# Patient Record
Sex: FEMALE | Race: WHITE | NOT HISPANIC OR LATINO | Employment: OTHER | URBAN - METROPOLITAN AREA
[De-identification: names, ages, dates, MRNs, and addresses within clinical notes are randomized per-mention and may not be internally consistent; named-entity substitution may affect disease eponyms.]

---

## 2017-02-25 LAB
ALBUMIN SERPL-MCNC: 4.6 G/DL (ref 3.6–4.8)
ALBUMIN/GLOB SERPL: 2.1 {RATIO} (ref 1.1–2.5)
ALP SERPL-CCNC: 84 IU/L (ref 39–117)
ALT SERPL-CCNC: 26 IU/L (ref 0–32)
AST SERPL-CCNC: 26 IU/L (ref 0–40)
BILIRUB SERPL-MCNC: 1.2 MG/DL (ref 0–1.2)
BUN SERPL-MCNC: 11 MG/DL (ref 8–27)
BUN/CREAT SERPL: 14 (ref 11–26)
CALCIUM SERPL-MCNC: 10.7 MG/DL (ref 8.7–10.3)
CHLORIDE SERPL-SCNC: 106 MMOL/L (ref 96–106)
CHOLEST SERPL-MCNC: 179 MG/DL (ref 100–199)
CO2 SERPL-SCNC: 22 MMOL/L (ref 18–29)
COMMENT 011824: ABNORMAL
CREAT SERPL-MCNC: 0.8 MG/DL (ref 0.57–1)
GLOBULIN SER CALC-MCNC: 2.2 G/DL (ref 1.5–4.5)
GLUCOSE SERPL-MCNC: 107 MG/DL (ref 65–99)
HDLC SERPL-MCNC: 62 MG/DL
LDLC SERPL CALC-MCNC: 83 MG/DL (ref 0–99)
POTASSIUM SERPL-SCNC: 4.4 MMOL/L (ref 3.5–5.2)
PROT SERPL-MCNC: 6.8 G/DL (ref 6–8.5)
SODIUM SERPL-SCNC: 144 MMOL/L (ref 134–144)
TRIGL SERPL-MCNC: 169 MG/DL (ref 0–149)
VLDLC SERPL CALC-MCNC: 34 MG/DL (ref 5–40)

## 2017-03-01 ENCOUNTER — OFFICE VISIT (OUTPATIENT)
Dept: INTERNAL MEDICINE | Facility: MEDICAL CENTER | Age: 67
End: 2017-03-01
Payer: COMMERCIAL

## 2017-03-01 VITALS
BODY MASS INDEX: 41.12 KG/M2 | SYSTOLIC BLOOD PRESSURE: 122 MMHG | RESPIRATION RATE: 20 BRPM | OXYGEN SATURATION: 96 % | HEART RATE: 82 BPM | WEIGHT: 217.8 LBS | HEIGHT: 61 IN | DIASTOLIC BLOOD PRESSURE: 60 MMHG | TEMPERATURE: 97.6 F

## 2017-03-01 DIAGNOSIS — E66.01 MORBID OBESITY DUE TO EXCESS CALORIES (HCC): ICD-10-CM

## 2017-03-01 DIAGNOSIS — I10 ESSENTIAL HYPERTENSION: ICD-10-CM

## 2017-03-01 DIAGNOSIS — E78.5 DYSLIPIDEMIA: ICD-10-CM

## 2017-03-01 DIAGNOSIS — G89.29 CHRONIC RIGHT SHOULDER PAIN: ICD-10-CM

## 2017-03-01 DIAGNOSIS — E83.52 HYPERCALCEMIA: ICD-10-CM

## 2017-03-01 DIAGNOSIS — R73.02 GLUCOSE INTOLERANCE (IMPAIRED GLUCOSE TOLERANCE): ICD-10-CM

## 2017-03-01 DIAGNOSIS — Z23 NEED FOR TD VACCINE: ICD-10-CM

## 2017-03-01 DIAGNOSIS — M25.511 CHRONIC RIGHT SHOULDER PAIN: ICD-10-CM

## 2017-03-01 PROCEDURE — 90714 TD VACC NO PRESV 7 YRS+ IM: CPT | Performed by: INTERNAL MEDICINE

## 2017-03-01 PROCEDURE — 90471 IMMUNIZATION ADMIN: CPT | Performed by: INTERNAL MEDICINE

## 2017-03-01 PROCEDURE — 99214 OFFICE O/P EST MOD 30 MIN: CPT | Mod: 25 | Performed by: INTERNAL MEDICINE

## 2017-03-01 RX ORDER — ATORVASTATIN CALCIUM 10 MG/1
TABLET, FILM COATED ORAL
Qty: 90 TAB | Refills: 2 | Status: SHIPPED | OUTPATIENT
Start: 2017-03-01 | End: 2017-03-08 | Stop reason: SDUPTHER

## 2017-03-01 RX ORDER — LISINOPRIL 20 MG/1
TABLET ORAL
Qty: 90 TAB | Refills: 2 | Status: SHIPPED | OUTPATIENT
Start: 2017-03-01 | End: 2017-03-08 | Stop reason: SDUPTHER

## 2017-03-01 ASSESSMENT — PAIN SCALES - GENERAL: PAINLEVEL: 3=SLIGHT PAIN

## 2017-03-01 NOTE — MR AVS SNAPSHOT
"        Suzanne Henderson   3/1/2017 10:40 AM   Office Visit   MRN: [id number]    Department:  [facility] Med - Internal Med   Dept Phone:  [phone]    Description:  Female : 1950   Provider:  Nila Wilde M.D.           Reason for Visit     Results labs    Medication Refill tramadol      Allergies as of 3/1/2017     No Known Allergies      You were diagnosed with     Chronic right shoulder pain   [914082]       Essential hypertension   [7698693]       Morbid obesity due to excess calories (CMS-[facility])   [3584454]       Dyslipidemia   [886714]       Hypercalcemia   [275.42.ICD-9-CM]       Glucose intolerance (impaired glucose tolerance)   [610260]       Need for TD vaccine   [221519]         Vital Signs     Blood Pressure Pulse Temperature Respirations Height Weight    122/60 mmHg 82 36.4 °C (97.6 °F) 20 1.549 m (5' 0.98\") 98.793 kg (217 lb 12.8 oz)    Body Mass Index Oxygen Saturation Breastfeeding? Smoking Status          41.17 kg/m2 96% No Former Smoker        Basic Information     Date Of Birth Sex Race Ethnicity Preferred Language    1950 Female White Non- English      Your appointments     Mar 14, 2017  7:30 AM   MR EXTREMITY WITHOUT (30) with [address] 1   [facility] IMAGING - MRI - [address]    [address]  [place] [address] [phone]            Sep 06, 2017 11:00 AM   Established Patient with Nila Wilde M.D.   [facility] Med - Internal Medicine (--)    [address] [place] [address]  [address]  [facility] [id number] [phone]           You will be receiving a confirmation call a few days before your appointment from our automated call confirmation system.              Problem List              ICD-10-CM Priority Class Noted - Resolved    Other specified disorder of gallbladder K82.8   2/10/2014 - Present    FH: ovarian cancer Z80.41   2016 - Present    Osteoarthritis of finger M19.049   2016 - Present    Morbid obesity (CMS-[facility]) E66.01   " 6/17/2016 - Present    Herpes zoster B02.9   6/17/2016 - Present    RA (rheumatoid arthritis) (CMS-[facility]) M06.9   6/17/2016 - Present    Back pain M54.9   6/17/2016 - Present    Hypercalcemia E83.52   6/17/2016 - Present    Childhood asthma J45.909   6/17/2016 - Present    Osteopenia M85.80   6/17/2016 - Present    Essential hypertension I10   6/17/2016 - Present    Dyslipidemia E78.5   6/17/2016 - Present    Chronic right shoulder pain M25.511, G89.29   3/1/2017 - Present      Health Maintenance        Date Due Completion Dates    IMM PNEUMOCOCCAL 19-64 (ADULT) MEDIUM RISK SERIES (1 of 1 - PPSV23) 3/25/1971 ---    PAP SMEAR 3/25/1973 ---    IMM DTaP/Tdap/Td Vaccine (1 - Tdap) 11/28/1993 11/27/1993    IMM ZOSTER VACCINE 3/25/2012 ---    IMM INFLUENZA (1) 9/1/2016 ---    COLONOSCOPY 7/11/2017 7/11/2007    MAMMOGRAM 11/2/2017 11/2/2016, 10/2/2015, 9/23/2014, 9/19/2013, 12/30/2011, 11/16/2010, 10/28/2009, 10/28/2009, 6/6/2008, 6/6/2008, 5/25/2007, 5/25/2007, 10/11/2005            Current Immunizations     TD Vaccine  Incomplete, 11/27/1993      Below and/or attached are the medications your provider expects you to take. Review all of your home medications and newly ordered medications with your provider and/or pharmacist. Follow medication instructions as directed by your provider and/or pharmacist. Please keep your medication list with you and share with your provider. Update the information when medications are discontinued, doses are changed, or new medications (including over-the-counter products) are added; and carry medication information at all times in the event of emergency situations     Allergies:  No Known Allergies          Medications  Valid as of: March 01, 2017 - 11:44 AM    Generic Name Brand Name Tablet Size Instructions for use    Aspirin (Chew Tab) ASA 81 MG Take 81 mg by mouth every 48 hours. Indications: taking mon and thurs        Atorvastatin Calcium (Tab) LIPITOR 10 MG TAKE 1 TABLET AT BEDTIME         Cholecalciferol (Tab) cholecalciferol 1000 UNIT Take 5,000 Units by mouth every evening. 3 times a week        Cyclobenzaprine HCl (Tab) FLEXERIL 10 MG Take 10 mg by mouth 3 times a day as needed.        Lisinopril (Tab) PRINIVIL 20 MG TAKE 1 TABLET DAILY        TraMADol HCl (Tab) ULTRAM 50 MG Take  mg by mouth every four hours as needed.        .                 Medicines prescribed today were sent to:     [facility] #[id number] - [place] - [address] [place] [address]    Phone: [phone] Fax: [phone]    Open 24 Hours?: No    [facility] - [address] [address]    Phone: [phone] Fax: [phone]    Open 24 Hours?: No      Medication refill instructions:       If your prescription bottle indicates you have medication refills left, it is not necessary to call your provider’s office. Please contact your pharmacy and they will refill your medication.    If your prescription bottle indicates you do not have any refills left, you may request refills at any time through one of the following ways: The online [employer] system (except Urgent Care), by calling your provider’s office, or by asking your pharmacy to contact your provider’s office with a refill request. Medication refills are processed only during regular business hours and may not be available until the next business day. Your provider may request additional information or to have a follow-up visit with you prior to refilling your medication.   *Please Note: Medication refills are assigned a new Rx number when refilled electronically. Your pharmacy may indicate that no refills were authorized even though a new prescription for the same medication is available at the pharmacy. Please request the medicine by name with the pharmacy before contacting your provider for a refill.        Your To Do List     Future Labs/Procedures Complete By Expires    CBC  WITH DIFFERENTIAL  As directed 3/1/2018    COMP METABOLIC PANEL  As directed 3/1/2018    HEMOGLOBIN A1C  As directed 3/1/2018    PTH INTACT  As directed 3/2/2018      Instructions    Shoulder Pain  The shoulder is the joint that connects your arms to your body. The bones that form the shoulder joint include the upper arm bone (humerus), the shoulder blade (scapula), and the collarbone (clavicle). The top of the humerus is shaped like a ball and fits into a rather flat socket on the scapula (glenoid cavity). A combination of muscles and strong, fibrous tissues that connect muscles to bones (tendons) support your shoulder joint and hold the ball in the socket. Small, fluid-filled sacs (bursae) are located in different areas of the joint. They act as cushions between the bones and the overlying soft tissues and help reduce friction between the gliding tendons and the bone as you move your arm. Your shoulder joint allows a wide range of motion in your arm. This range of motion allows you to do things like scratch your back or throw a ball. However, this range of motion also makes your shoulder more prone to pain from overuse and injury.  Causes of shoulder pain can originate from both injury and overuse and usually can be grouped in the following four categories:  · Redness, swelling, and pain (inflammation) of the tendon (tendinitis) or the bursae (bursitis).  · Instability, such as a dislocation of the joint.  · Inflammation of the joint (arthritis).  · Broken bone (fracture).  HOME CARE INSTRUCTIONS   · Apply ice to the sore area.  ¨ Put ice in a plastic bag.  ¨ Place a towel between your skin and the bag.  ¨ Leave the ice on for 15-20 minutes, 3-4 times per day for the first 2 days, or as directed by your health care provider.  · Stop using cold packs if they do not help with the pain.  · If you have a shoulder sling or immobilizer, wear it as long as your caregiver instructs. Only remove it to shower or bathe. Move  your arm as little as possible, but keep your hand moving to prevent swelling.  · Squeeze a soft ball or foam pad as much as possible to help prevent swelling.  · Only take over-the-counter or prescription medicines for pain, discomfort, or fever as directed by your caregiver.  SEEK MEDICAL CARE IF:   · Your shoulder pain increases, or new pain develops in your arm, hand, or fingers.  · Your hand or fingers become cold and numb.  · Your pain is not relieved with medicines.  SEEK IMMEDIATE MEDICAL CARE IF:   · Your arm, hand, or fingers are numb or tingling.  · Your arm, hand, or fingers are significantly swollen or turn white or blue.  MAKE SURE YOU:   · Understand these instructions.  · Will watch your condition.  · Will get help right away if you are not doing well or get worse.     This information is not intended to replace advice given to you by your health care provider. Make sure you discuss any questions you have with your health care provider.     Document Released: 09/27/2006 Document Revised: 01/08/2016 Document Reviewed: 04/11/2016  [employer] Interactive Patient Education ©2016 Elsevier Inc.            [employer] Access Code: Activation code not generated  Current [employer] Status: Active

## 2017-03-01 NOTE — PATIENT INSTRUCTIONS
Shoulder Pain  The shoulder is the joint that connects your arms to your body. The bones that form the shoulder joint include the upper arm bone (humerus), the shoulder blade (scapula), and the collarbone (clavicle). The top of the humerus is shaped like a ball and fits into a rather flat socket on the scapula (glenoid cavity). A combination of muscles and strong, fibrous tissues that connect muscles to bones (tendons) support your shoulder joint and hold the ball in the socket. Small, fluid-filled sacs (bursae) are located in different areas of the joint. They act as cushions between the bones and the overlying soft tissues and help reduce friction between the gliding tendons and the bone as you move your arm. Your shoulder joint allows a wide range of motion in your arm. This range of motion allows you to do things like scratch your back or throw a ball. However, this range of motion also makes your shoulder more prone to pain from overuse and injury.  Causes of shoulder pain can originate from both injury and overuse and usually can be grouped in the following four categories:  · Redness, swelling, and pain (inflammation) of the tendon (tendinitis) or the bursae (bursitis).  · Instability, such as a dislocation of the joint.  · Inflammation of the joint (arthritis).  · Broken bone (fracture).  HOME CARE INSTRUCTIONS   · Apply ice to the sore area.  ¨ Put ice in a plastic bag.  ¨ Place a towel between your skin and the bag.  ¨ Leave the ice on for 15-20 minutes, 3-4 times per day for the first 2 days, or as directed by your health care provider.  · Stop using cold packs if they do not help with the pain.  · If you have a shoulder sling or immobilizer, wear it as long as your caregiver instructs. Only remove it to shower or bathe. Move your arm as little as possible, but keep your hand moving to prevent swelling.  · Squeeze a soft ball or foam pad as much as possible to help prevent swelling.  · Only take  over-the-counter or prescription medicines for pain, discomfort, or fever as directed by your caregiver.  SEEK MEDICAL CARE IF:   · Your shoulder pain increases, or new pain develops in your arm, hand, or fingers.  · Your hand or fingers become cold and numb.  · Your pain is not relieved with medicines.  SEEK IMMEDIATE MEDICAL CARE IF:   · Your arm, hand, or fingers are numb or tingling.  · Your arm, hand, or fingers are significantly swollen or turn white or blue.  MAKE SURE YOU:   · Understand these instructions.  · Will watch your condition.  · Will get help right away if you are not doing well or get worse.     This information is not intended to replace advice given to you by your health care provider. Make sure you discuss any questions you have with your health care provider.     Document Released: 09/27/2006 Document Revised: 01/08/2016 Document Reviewed: 04/11/2016  [email or web address] Interactive Patient Education ©2016 Elsevier Inc.

## 2017-03-02 NOTE — PROGRESS NOTES
"Chief Complaint   Patient presents with   • Results     labs   • Medication Refill     tramadol       HISTORY OF PRESENT ILLNESS: Patient is a 66 y.o. female established patient who presents today for the following.      1. Chronic right shoulder pain  Complaint of right shoulder issues for more than a year. She remembers doing something in the garden with pulling and pushing and noticed some pain in her shoulder long time ago. Tried physical therapy but did not help. Seen orthopedic doctor in the past and an MRI scheduled after recent rheumatology evaluation. Currently on tramadol for pain    2. Essential hypertension  Taking her blood pressure pills on a regular basis and denies having any dizziness, chest pains, shortness of breath, blurry vision or headaches    3. Morbid obesity due to excess calories (CMS-HCC)  Gained some weight since last visit due to stressors and breakup with her partner after 15 years. Also planning to move to [place] eventually as there is a threat to her job with some contract issues.    4. Dyslipidemia  Taking a statin on a regular basis and no side effects of myalgias    5. Hypercalcemia  Denies any abdominal pain or pain with urination or blood in urine.    6. Glucose intolerance (impaired glucose tolerance)  Had done labs recently and here for follow-up has been eating a lot of sweets due to her stressors    7. Need for TD vaccine  Does not her mother last tetanus vaccine      Past Medical History   Diagnosis Date   • Hypertension    • Anesthesia      Severe PONV   • Arthritis      generalized   • Other specified disorder of intestines      constipation/diarrhea   • Dental disorder      one tooth with a \"pocket\"; harbors bacteria; packed with \"medicine\"   • Pain 02/07/14     RUQ 2/10   • Allergy, unspecified not elsewhere classified    • FH: ovarian cancer 6/17/2016   • Osteoarthritis of finger 6/17/2016   • Hand numbness 6/17/2016   • Morbid obesity (CMS-HCC) 6/17/2016   • Herpes " zoster 6/17/2016   • RA (rheumatoid arthritis) (CMS-HCC) 6/17/2016   • Back pain 6/17/2016   • Hypercalcemia 6/17/2016   • Childhood asthma 6/17/2016   • Osteopenia 6/17/2016   • HTN (hypertension) 6/17/2016   • Dyslipidemia 6/17/2016       Patient Active Problem List    Diagnosis Date Noted   • Chronic right shoulder pain 03/01/2017   • FH: ovarian cancer 06/17/2016   • Osteoarthritis of finger 06/17/2016   • Morbid obesity (CMS-HCC) 06/17/2016   • Herpes zoster 06/17/2016   • RA (rheumatoid arthritis) (CMS-HCC) 06/17/2016   • Back pain 06/17/2016   • Hypercalcemia 06/17/2016   • Childhood asthma 06/17/2016   • Osteopenia 06/17/2016   • Essential hypertension 06/17/2016   • Dyslipidemia 06/17/2016   • Other specified disorder of gallbladder 02/10/2014       Allergies:Review of patient's allergies indicates no known allergies.    Current Outpatient Prescriptions   Medication Sig Dispense Refill   • atorvastatin (LIPITOR) 10 MG Tab TAKE 1 TABLET AT BEDTIME 90 Tab 2   • lisinopril (PRINIVIL) 20 MG Tab TAKE 1 TABLET DAILY 90 Tab 2   • cyclobenzaprine (FLEXERIL) 10 MG Tab Take 10 mg by mouth 3 times a day as needed.     • tramadol (ULTRAM) 50 MG Tab Take  mg by mouth every four hours as needed.     • aspirin (ASA) 81 MG CHEW Take 81 mg by mouth every 48 hours. Indications: taking mon and thurs     • vitamin D (CHOLECALCIFEROL) 1000 UNIT TABS Take 5,000 Units by mouth every evening. 3 times a week       No current facility-administered medications for this visit.       Social History   Substance Use Topics   • Smoking status: Former Smoker -- 1.00 packs/day for 20 years     Types: Cigarettes     Quit date: 01/01/1985   • Smokeless tobacco: Never Used   • Alcohol Use: 1.2 oz/week     2 Shots of liquor, 0 Standard drinks or equivalent per week      Comment: soc        Family History   Problem Relation Age of Onset   • Cancer Mother      OVARIAN         Review of Systems    Patient denies  "Fevers/chills/nausea/vomiting/chest pain/sob/blood in stools/black stools/blood in urine. See HPI    Exam:  Blood pressure 122/60, pulse 82, temperature 36.4 °C (97.6 °F), resp. rate 20, height 1.549 m (5' 0.98\"), weight 98.793 kg (217 lb 12.8 oz), SpO2 96 %, not currently breastfeeding. Body mass index is 41.17 kg/(m^2).  Constitutional:  NAD, well appearing.  HEENT:   NC/AT  Cardiovascular: RRR.   No m/r/g. No carotid bruits.       Lungs:   CTAB, no w/r/r, no respiratory distress.  Abdomen: Soft, NT/ND + BS, no masses, no suprapubic tenderness, no hepatomegaly.  Extremities:  2+ DP and radial pulses bilaterally.  No c/c/e. Range of motion limited due to pain but she is able to rotate and abduct and adduct with some assistance.  Skin:  Warm and dry.    Neurologic: Alert & oriented x 3, CN II-XII grossly intact, strength and sensation grossly intact.  No focal deficits noted.  Psychiatric:  Affect normal, mood normal, judgment normal.    Assessment/Plan:     1. Chronic right shoulder pain  Continue follow-up with rheumatologist and MRI as scheduled discussed about taking tramadol with Tylenol if the pain is intense.  - CBC WITH DIFFERENTIAL; Future    2. Essential hypertension  Low salt diet and exercise to be continued along with the lisinopril.  - lisinopril (PRINIVIL) 20 MG Tab; TAKE 1 TABLET DAILY  Dispense: 90 Tab; Refill: 2  - COMP METABOLIC PANEL; Future    3. Morbid obesity due to excess calories (CMS-HCC)  Discussed about diet and exercise and need to cut down processed food as well as other sugar especially given her new diagnosis of glucose intolerance. Patient understands the necessity to lose weight    4. Dyslipidemia  Diet and exercise to be maintained and continue atorvastatin as prescribed  - atorvastatin (LIPITOR) 10 MG Tab; TAKE 1 TABLET AT BEDTIME  Dispense: 90 Tab; Refill: 2    5. Hypercalcemia  Chronic elevation but Calcium levels much better now than the past and continue drinking plenty of " fluids and stay well from calcium supplements. Follow-up PTH level  - CBC WITH DIFFERENTIAL; Future  - COMP METABOLIC PANEL; Future  - PTH INTACT; Future    6. Glucose intolerance (impaired glucose tolerance)  New onset of glucose intolerance and discussed about its relation with her weight gain. Patient understands the necessity to lose weight check A1c next visit  - HEMOGLOBIN A1C; Future    7. Need for TD vaccine  TD vaccine given today in the office  - TD =>6yo IM      Followup: Return in about 6 months (around 9/1/2017).    Please note that this dictation was created using voice recognition software. I have made every reasonable attempt to correct obvious errors, but I expect that there are errors of grammar and possibly content that I did not discover before finalizing the note.

## 2017-03-08 DIAGNOSIS — I10 ESSENTIAL HYPERTENSION: ICD-10-CM

## 2017-03-08 DIAGNOSIS — E78.5 DYSLIPIDEMIA: ICD-10-CM

## 2017-03-08 RX ORDER — ATORVASTATIN CALCIUM 10 MG/1
TABLET, FILM COATED ORAL
Qty: 90 TAB | Refills: 3 | Status: SHIPPED | OUTPATIENT
Start: 2017-03-08 | End: 2017-09-08 | Stop reason: SDUPTHER

## 2017-03-08 RX ORDER — LISINOPRIL 20 MG/1
TABLET ORAL
Qty: 90 TAB | Refills: 2 | Status: SHIPPED | OUTPATIENT
Start: 2017-03-08 | End: 2017-09-08 | Stop reason: SDUPTHER

## 2017-03-08 NOTE — TELEPHONE ENCOUNTER
Pt would like to know if she should be on the atorvastatin due to labs readings showing she is always high on calcium

## 2017-03-08 NOTE — TELEPHONE ENCOUNTER
Last seen: 03/03/17 by Dr. Wilde  Next appt: 09/06/17 with Dr. Wilde    Was the patient seen in the last year in this department? Yes   Does patient have an active prescription for medications requested? No   Received Request Via: Pharmacy Mail svcs      Pt also would like to know on the atorvastatin it states it has calcium when she gets lab work done it shows calcium is high should she continue taking atorvastatin

## 2017-03-14 ENCOUNTER — APPOINTMENT (OUTPATIENT)
Dept: RADIOLOGY | Facility: MEDICAL CENTER | Age: 67
End: 2017-03-14
Attending: SPECIALIST
Payer: COMMERCIAL

## 2017-04-17 RX ORDER — CYCLOBENZAPRINE HCL 10 MG
10 TABLET ORAL 3 TIMES DAILY PRN
Qty: 30 TAB | Refills: 0 | Status: SHIPPED | OUTPATIENT
Start: 2017-04-17 | End: 2017-04-28

## 2017-04-17 NOTE — TELEPHONE ENCOUNTER
Last seen: 03/01/17 by Dr. Wilde  Next appt: 09/06/17 with Dr. Wilde    Was the patient seen in the last year in this department? Yes   Does patient have an active prescription for medications requested? No   Received Request Via: Pharmacy

## 2017-07-20 ENCOUNTER — TELEPHONE (OUTPATIENT)
Dept: INTERNAL MEDICINE | Facility: MEDICAL CENTER | Age: 67
End: 2017-07-20

## 2017-07-20 NOTE — TELEPHONE ENCOUNTER
? Is this stinger out? If not the recommendation is to scrape it sideways with the edge of a credit card to pull out the stinger. Not to grab it with tweezers. And generally just applying ice and maybe a topical steroid cream such as over-the-counter Cortaid is all that is needed. If there is significant swelling pain or redness she should be seen in the office. If there is any problems breathing she needs to call 911.

## 2017-07-20 NOTE — TELEPHONE ENCOUNTER
----- Message from Chung Rogers sent at 7/20/2017 10:15 AM PDT -----  Regarding: QUESTIONS ABOUT WASP STING[facility]  Contact: [phone]  Pt states she was stung by a wasp and has a question about what to do.

## 2017-09-07 LAB
ALBUMIN SERPL-MCNC: 4.5 G/DL (ref 3.6–4.8)
ALBUMIN/GLOB SERPL: 1.8 {RATIO} (ref 1.2–2.2)
ALP SERPL-CCNC: 93 IU/L (ref 39–117)
ALT SERPL-CCNC: 29 IU/L (ref 0–32)
AST SERPL-CCNC: 29 IU/L (ref 0–40)
BASOPHILS # BLD AUTO: 0 X10E3/UL (ref 0–0.2)
BASOPHILS NFR BLD AUTO: 0 %
BILIRUB SERPL-MCNC: 0.7 MG/DL (ref 0–1.2)
BUN SERPL-MCNC: 11 MG/DL (ref 8–27)
BUN/CREAT SERPL: 15 (ref 12–28)
CALCIUM SERPL-MCNC: 10.5 MG/DL (ref 8.7–10.3)
CHLORIDE SERPL-SCNC: 107 MMOL/L (ref 96–106)
CO2 SERPL-SCNC: 18 MMOL/L (ref 18–29)
CREAT SERPL-MCNC: 0.72 MG/DL (ref 0.57–1)
EOSINOPHIL # BLD AUTO: 0.1 X10E3/UL (ref 0–0.4)
EOSINOPHIL NFR BLD AUTO: 2 %
ERYTHROCYTE [DISTWIDTH] IN BLOOD BY AUTOMATED COUNT: 13.6 % (ref 12.3–15.4)
GLOBULIN SER CALC-MCNC: 2.5 G/DL (ref 1.5–4.5)
GLUCOSE SERPL-MCNC: 112 MG/DL (ref 65–99)
HBA1C MFR BLD: 5.5 % (ref 4.8–5.6)
HCT VFR BLD AUTO: 41.5 % (ref 34–46.6)
HGB BLD-MCNC: 14.1 G/DL (ref 11.1–15.9)
IMM GRANULOCYTES # BLD: 0 X10E3/UL (ref 0–0.1)
IMM GRANULOCYTES NFR BLD: 0 %
IMMATURE CELLS  115398: NORMAL
LYMPHOCYTES # BLD AUTO: 2.3 X10E3/UL (ref 0.7–3.1)
LYMPHOCYTES NFR BLD AUTO: 38 %
MCH RBC QN AUTO: 31.6 PG (ref 26.6–33)
MCHC RBC AUTO-ENTMCNC: 34 G/DL (ref 31.5–35.7)
MCV RBC AUTO: 93 FL (ref 79–97)
MONOCYTES # BLD AUTO: 0.8 X10E3/UL (ref 0.1–0.9)
MONOCYTES NFR BLD AUTO: 13 %
MORPHOLOGY BLD-IMP: NORMAL
NEUTROPHILS # BLD AUTO: 2.9 X10E3/UL (ref 1.4–7)
NEUTROPHILS NFR BLD AUTO: 47 %
NRBC BLD AUTO-RTO: NORMAL %
PLATELET # BLD AUTO: 211 X10E3/UL (ref 150–379)
POTASSIUM SERPL-SCNC: 4.1 MMOL/L (ref 3.5–5.2)
PROT SERPL-MCNC: 7 G/DL (ref 6–8.5)
PTH-INTACT SERPL-MCNC: 68 PG/ML (ref 15–65)
RBC # BLD AUTO: 4.46 X10E6/UL (ref 3.77–5.28)
SODIUM SERPL-SCNC: 141 MMOL/L (ref 134–144)
WBC # BLD AUTO: 6.1 X10E3/UL (ref 3.4–10.8)

## 2017-09-08 ENCOUNTER — OFFICE VISIT (OUTPATIENT)
Dept: INTERNAL MEDICINE | Facility: MEDICAL CENTER | Age: 67
End: 2017-09-08
Payer: MEDICARE

## 2017-09-08 VITALS
WEIGHT: 218 LBS | OXYGEN SATURATION: 96 % | HEART RATE: 60 BPM | DIASTOLIC BLOOD PRESSURE: 78 MMHG | TEMPERATURE: 98.4 F | SYSTOLIC BLOOD PRESSURE: 130 MMHG | HEIGHT: 61 IN | BODY MASS INDEX: 41.16 KG/M2

## 2017-09-08 DIAGNOSIS — M25.50 POLYARTHRALGIA: ICD-10-CM

## 2017-09-08 DIAGNOSIS — E66.01 MORBID OBESITY DUE TO EXCESS CALORIES (HCC): ICD-10-CM

## 2017-09-08 DIAGNOSIS — G89.29 CHRONIC UPPER BACK PAIN: ICD-10-CM

## 2017-09-08 DIAGNOSIS — M81.0 POST-MENOPAUSAL OSTEOPOROSIS: ICD-10-CM

## 2017-09-08 DIAGNOSIS — I10 ESSENTIAL HYPERTENSION: ICD-10-CM

## 2017-09-08 DIAGNOSIS — E83.52 HYPERCALCEMIA: ICD-10-CM

## 2017-09-08 DIAGNOSIS — M54.9 CHRONIC UPPER BACK PAIN: ICD-10-CM

## 2017-09-08 DIAGNOSIS — E78.5 DYSLIPIDEMIA: ICD-10-CM

## 2017-09-08 PROCEDURE — 99214 OFFICE O/P EST MOD 30 MIN: CPT | Performed by: INTERNAL MEDICINE

## 2017-09-08 RX ORDER — LISINOPRIL 20 MG/1
TABLET ORAL
Qty: 90 TAB | Refills: 3 | Status: SHIPPED
Start: 2017-09-08 | End: 2017-09-25 | Stop reason: SDUPTHER

## 2017-09-08 RX ORDER — WITCH HAZEL 50 %
PADS, MEDICATED (EA) TOPICAL
COMMUNITY
End: 2019-04-10

## 2017-09-08 RX ORDER — TRAMADOL HYDROCHLORIDE 50 MG/1
50-100 TABLET ORAL EVERY 8 HOURS PRN
Qty: 60 TAB | Refills: 2 | Status: SHIPPED
Start: 2017-09-08

## 2017-09-08 RX ORDER — TIZANIDINE 4 MG/1
4 TABLET ORAL 3 TIMES DAILY
Qty: 30 TAB | Refills: 0 | Status: SHIPPED
Start: 2017-09-08

## 2017-09-08 RX ORDER — ATORVASTATIN CALCIUM 10 MG/1
TABLET, FILM COATED ORAL
Qty: 90 TAB | Refills: 3 | Status: SHIPPED
Start: 2017-09-08 | End: 2017-09-25 | Stop reason: SDUPTHER

## 2017-09-08 RX ORDER — TIZANIDINE 4 MG/1
4 TABLET ORAL 3 TIMES DAILY
Qty: 30 TAB | Refills: 0 | Status: SHIPPED | OUTPATIENT
Start: 2017-09-08 | End: 2017-09-08 | Stop reason: SDUPTHER

## 2017-09-08 NOTE — PATIENT INSTRUCTIONS
Degenerative Arthritis  You have osteoarthritis. This is the wear and tear arthritis that comes with aging. It is also called degenerative arthritis. This is common in people past middle age. It is caused by stress on the joints. The large weight bearing joints of the lower extremities are most often affected. The knees, hips, back, neck, and hands can become painful, swollen, and stiff. This is the most common type of arthritis. It comes on with age, carrying too much weight, or from an injury.  Treatment includes resting the sore joint until the pain and swelling improve. Crutches or a walker may be needed for severe flares. Only take over-the-counter or prescription medicines for pain, discomfort, or fever as directed by your caregiver. Local heat therapy may improve motion. Cortisone shots into the joint are sometimes used to reduce pain and swelling during flares.  Osteoarthritis is usually not crippling and progresses slowly. There are things you can do to decrease pain:  · Avoid high impact activities.   · Exercise regularly.   · Low impact exercises such as walking, biking and swimming help to keep the muscles strong and keep normal joint function.   · Stretching helps to keep your range of motion.   · Lose weight if you are overweight. This reduces joint stress.   In severe cases when you have pain at rest or increasing disability, joint surgery may be helpful. See your caregiver for follow-up treatment as recommended.   SEEK IMMEDIATE MEDICAL CARE IF:   · You have severe joint pain.   · Marked swelling and redness in your joint develops.   · You develop a high fever.   Document Released: 12/18/2006 Document Revised: 03/11/2013 Document Reviewed: 05/19/2008  [employer] Patient Information ©2013 [employer].

## 2017-09-08 NOTE — PROGRESS NOTES
date  Chief Complaint   Patient presents with   • Follow-Up     R shoulder, discuss colonoscopy   • Results     labs-high calcium discuss   • Back Pain     between shoulder blades       HISTORY OF PRESENT ILLNESS: Patient is a 67 y.o. female established patient who presents today for the following.      1. Chronic upper back pain  Complaint of Pain between the shoulder blades x 6 months.Cannot bend over. Friend gave cannabis cream and thats helping. No MVA. Worse in the morning when she wakes up. Has to Roll over to get up. She has been taking her tramadol as needed for multiple joint pains.      2. Polyarthralgia  Complaint of joint pains in her knees, shoulders, back, hips for chronic period of time. She had a diagnosis of rheumatoid arthritis in the past but there was no medication I will prescribed. Questionable suspicion if it's not actual rate    3. Post-menopausal osteoporosis  Patient did not have a DEXA scan done in few years and interested in it after discussion    4. Essential hypertension  Taking blood pressure the lisinopril on a regular basis and denies having any headaches or dizziness or blurry vision    5. Hypercalcemia  Primary history of high calcium levels and the denies having any abdominal pain or kidney stones or gallstones problems. Not taking any calcium supplements and doing well    6. Morbid obesity due to excess calories (CMS-HCC)  Chronic issue with weight and in the last one year she has gained 10 pounds. Not able to exercise enough or monitor diet closely     7. Dyslipidemia  Taking cholesterol R was started on regular basis and denies having any side effects.      Past Medical History:   Diagnosis Date   • FH: ovarian cancer 6/17/2016   • Osteoarthritis of finger 6/17/2016   • Hand numbness 6/17/2016   • Morbid obesity (CMS-HCC) 6/17/2016   • Herpes zoster 6/17/2016   • RA (rheumatoid arthritis) (CMS-HCC) 6/17/2016   • Back pain 6/17/2016   • Hypercalcemia 6/17/2016   • Childhood  "asthma 6/17/2016   • Osteopenia 6/17/2016   • HTN (hypertension) 6/17/2016   • Dyslipidemia 6/17/2016   • Pain 02/07/14    RUQ 2/10   • Allergy, unspecified not elsewhere classified    • Anesthesia     Severe PONV   • Arthritis     generalized   • Dental disorder     one tooth with a \"pocket\"; harbors bacteria; packed with \"medicine\"   • Hypertension    • Other specified disorder of intestines     constipation/diarrhea       Patient Active Problem List    Diagnosis Date Noted   • Polyarthralgia 09/08/2017   • Chronic right shoulder pain 03/01/2017   • FH: ovarian cancer 06/17/2016   • Osteoarthritis of finger 06/17/2016   • Morbid obesity (CMS-HCC) 06/17/2016   • Herpes zoster 06/17/2016   • RA (rheumatoid arthritis) (CMS-HCC) 06/17/2016   • Back pain 06/17/2016   • Hypercalcemia 06/17/2016   • Childhood asthma 06/17/2016   • Osteopenia 06/17/2016   • Essential hypertension 06/17/2016   • Dyslipidemia 06/17/2016   • Other specified disorder of gallbladder 02/10/2014       Allergies:Review of patient's allergies indicates no known allergies.    Current Outpatient Prescriptions   Medication Sig Dispense Refill   • Misc Natural Products (GLUCOSAMINE-CHONDROITIN DS) Tab Take  by mouth.     • lisinopril (PRINIVIL) 20 MG Tab TAKE 1 TABLET DAILY 90 Tab 3   • atorvastatin (LIPITOR) 10 MG Tab TAKE 1 TABLET AT BEDTIME 90 Tab 3   • tramadol (ULTRAM) 50 MG Tab Take 1-2 Tabs by mouth every 8 hours as needed for Severe Pain. 60 Tab 2   • tizanidine (ZANAFLEX) 4 MG Tab Take 1 Tab by mouth 3 times a day. 30 Tab 0   • aspirin (ASA) 81 MG CHEW Take 81 mg by mouth every 48 hours. Indications: taking mon and thurs     • vitamin D (CHOLECALCIFEROL) 1000 UNIT TABS Take 5,000 Units by mouth every evening. 3 times a week       No current facility-administered medications for this visit.        Social History   Substance Use Topics   • Smoking status: Former Smoker     Packs/day: 1.00     Years: 20.00     Types: Cigarettes     Quit date: " "1/1/1985   • Smokeless tobacco: Never Used   • Alcohol use 1.2 oz/week     2 Shots of liquor per week      Comment: soc        Family History   Problem Relation Age of Onset   • Cancer Mother      OVARIAN         Review of Systems    Patient denies Fevers/chills/nausea/vomiting/chest pain/sob/blood in stools/black stools/blood in urine.all other systems are reviewed See HPI    Exam:  Blood pressure 130/78, pulse 60, temperature 36.9 °C (98.4 °F), height 1.549 m (5' 1\"), weight 98.9 kg (218 lb), SpO2 96 %. Body mass index is 41.19 kg/m².  Constitutional:  NAD, well appearing.  HEENT:   NC/AT  Cardiovascular: RRR.   No m/r/g. No carotid bruits.       Lungs:   CTAB, no w/r/r, no respiratory distress.  Abdomen: Soft, NT/ND + BS, no masses, no suprapubic tenderness, no hepatomegaly.  Extremities:  2+ DP and radial pulses bilaterally.  No c/c/e. No small joint swelling in the hands   Musculoskeletal: No point tenderness in the spine on deep palpation. Range of motion otherwise okay  Skin:  Warm and dry.    Neurologic: Alert & oriented x 3, CN II-XII grossly intact, strength and sensation grossly intact.  No focal deficits noted.  Psychiatric:  Affect normal, mood normal, judgment normal.    Assessment/Plan:     1. Chronic upper back pain  Patient has persistent back issues and so we will try muscle relaxants to help with her pain and continue tramadol as needed.  - Misc Natural Products (GLUCOSAMINE-CHONDROITIN DS) Tab; Take  by mouth.  - tramadol (ULTRAM) 50 MG Tab; Take 1-2 Tabs by mouth every 8 hours as needed for Severe Pain.  Dispense: 60 Tab; Refill: 2  - tizanidine (ZANAFLEX) 4 MG Tab; Take 1 Tab by mouth 3 times a day.  Dispense: 30 Tab; Refill: 0    2. Polyarthralgia  The patient has his diagnosis of rheumatoid arthritis in her chart for some time and so evaluate for CCP as well as rheumatoid factor and inflammation markers. Does not have any physical findings of rheumatoid arthritis in her hands.  - Misc Natural " Products (GLUCOSAMINE-CHONDROITIN DS) Tab; Take  by mouth.  - RHEUMATOID ARTHRITIS FACTOR; Future  - CCP ANTIBODY; Future  - WESTERGREN SED RATE; Future  - CRP QUANTITIVE (NON-CARDIAC); Future  - tramadol (ULTRAM) 50 MG Tab; Take 1-2 Tabs by mouth every 8 hours as needed for Severe Pain.  Dispense: 60 Tab; Refill: 2    3. Post-menopausal osteoporosis  Patient is postmenopausal for 10+ years and said estimated DEXA scan to evaluate for osteoporosis  - DS-BONE DENSITY STUDY (DEXA); Future    4. Essential hypertension  Continue low-salt diet and exercise and currently stable on lisinopril.  - lisinopril (PRINIVIL) 20 MG Tab; TAKE 1 TABLET DAILY  Dispense: 90 Tab; Refill: 3    5. Hypercalcemia  Also Been Running High but Much Better Than the past. PTH level is slightly higher indicating primary hyperparathyroidism but not significant. Continue Vitamin D supplementation  - Misc Natural Products (GLUCOSAMINE-CHONDROITIN DS) Tab; Take  by mouth.  - RHEUMATOID ARTHRITIS FACTOR; Future  - CCP ANTIBODY; Future  - WESTERGREN SED RATE; Future  - CRP QUANTITIVE (NON-CARDIAC); Future  - DS-BONE DENSITY STUDY (DEXA); Future    6. Morbid obesity due to excess calories (CMS-HCC)  Discussed about diet and exercise and watch calories    7. Dyslipidemia  Diet and exercise to be maintained along with her cholesterol medication regular basis.  - atorvastatin (LIPITOR) 10 MG Tab; TAKE 1 TABLET AT BEDTIME  Dispense: 90 Tab; Refill: 3      All imaging results and lab results and consult notes are reviewed at this visit.  Followup: Return in about 3 months (around 12/8/2017).    Please note that this dictation was created using voice recognition software. I have made every reasonable attempt to correct obvious errors, but I expect that there are errors of grammar and possibly content that I did not discover before finalizing the note.

## 2017-09-12 ENCOUNTER — HOSPITAL ENCOUNTER (OUTPATIENT)
Dept: RADIOLOGY | Facility: MEDICAL CENTER | Age: 67
End: 2017-09-12
Attending: INTERNAL MEDICINE
Payer: MEDICARE

## 2017-09-12 DIAGNOSIS — I10 ESSENTIAL HYPERTENSION: ICD-10-CM

## 2017-09-12 DIAGNOSIS — M81.0 POST-MENOPAUSAL OSTEOPOROSIS: ICD-10-CM

## 2017-09-12 DIAGNOSIS — E66.01 MORBID OBESITY DUE TO EXCESS CALORIES (HCC): ICD-10-CM

## 2017-09-12 DIAGNOSIS — E78.5 DYSLIPIDEMIA: ICD-10-CM

## 2017-09-12 DIAGNOSIS — G89.29 CHRONIC UPPER BACK PAIN: ICD-10-CM

## 2017-09-12 DIAGNOSIS — M54.9 CHRONIC UPPER BACK PAIN: ICD-10-CM

## 2017-09-12 DIAGNOSIS — M25.50 POLYARTHRALGIA: ICD-10-CM

## 2017-09-12 DIAGNOSIS — E83.52 HYPERCALCEMIA: ICD-10-CM

## 2017-09-12 PROCEDURE — 72072 X-RAY EXAM THORAC SPINE 3VWS: CPT

## 2017-09-25 ENCOUNTER — TELEPHONE (OUTPATIENT)
Dept: INTERNAL MEDICINE | Facility: MEDICAL CENTER | Age: 67
End: 2017-09-25

## 2017-09-25 DIAGNOSIS — I10 ESSENTIAL HYPERTENSION: ICD-10-CM

## 2017-09-25 DIAGNOSIS — E78.5 DYSLIPIDEMIA: ICD-10-CM

## 2017-09-25 NOTE — TELEPHONE ENCOUNTER
Last seen: 09/08/17 by Dr. Wilde  Next appt: 03/09/18 with Dr. Charles    Was the patient seen in the last year in this department? Yes   Does patient have an active prescription for medications requested? No   Received Request Via: Pharmacy

## 2017-09-25 NOTE — TELEPHONE ENCOUNTER
1. Caller Name: Suzanne Henderson       Call Back Number: [phone] (home)         Patient approves a detailed voicemail message: yes    2. Patient is requesting imaging - Xray Back results dated: 9/12/2017    3. Confirmed results are in chart. Patient advised they will be contacted once interpreted by provider.

## 2017-09-26 ENCOUNTER — TELEPHONE (OUTPATIENT)
Dept: INTERNAL MEDICINE | Facility: MEDICAL CENTER | Age: 67
End: 2017-09-26

## 2017-09-26 RX ORDER — ATORVASTATIN CALCIUM 10 MG/1
TABLET, FILM COATED ORAL
Qty: 90 TAB | Refills: 3 | Status: SHIPPED | OUTPATIENT
Start: 2017-09-26 | End: 2018-07-18 | Stop reason: SDUPTHER

## 2017-09-26 RX ORDER — LISINOPRIL 20 MG/1
TABLET ORAL
Qty: 90 TAB | Refills: 3 | Status: SHIPPED | OUTPATIENT
Start: 2017-09-26 | End: 2018-07-18 | Stop reason: SDUPTHER

## 2018-04-03 ENCOUNTER — HOSPITAL ENCOUNTER (OUTPATIENT)
Dept: RADIOLOGY | Facility: MEDICAL CENTER | Age: 68
End: 2018-04-03
Attending: INTERNAL MEDICINE
Payer: MEDICARE

## 2018-04-03 DIAGNOSIS — Z12.31 SCREENING MAMMOGRAM, ENCOUNTER FOR: ICD-10-CM

## 2018-04-03 PROCEDURE — 77063 BREAST TOMOSYNTHESIS BI: CPT

## 2018-04-11 ENCOUNTER — OFFICE VISIT (OUTPATIENT)
Dept: INTERNAL MEDICINE | Facility: MEDICAL CENTER | Age: 68
End: 2018-04-11
Payer: MEDICARE

## 2018-04-11 VITALS
WEIGHT: 215.2 LBS | SYSTOLIC BLOOD PRESSURE: 131 MMHG | TEMPERATURE: 97.4 F | BODY MASS INDEX: 40.63 KG/M2 | HEART RATE: 59 BPM | HEIGHT: 61 IN | OXYGEN SATURATION: 97 % | DIASTOLIC BLOOD PRESSURE: 72 MMHG

## 2018-04-11 DIAGNOSIS — E83.52 HYPERCALCEMIA: ICD-10-CM

## 2018-04-11 DIAGNOSIS — G89.29 CHRONIC MIDLINE THORACIC BACK PAIN: ICD-10-CM

## 2018-04-11 DIAGNOSIS — I10 ESSENTIAL HYPERTENSION: ICD-10-CM

## 2018-04-11 DIAGNOSIS — M85.88 OSTEOPENIA OF SPINE: ICD-10-CM

## 2018-04-11 DIAGNOSIS — M54.6 CHRONIC MIDLINE THORACIC BACK PAIN: ICD-10-CM

## 2018-04-11 DIAGNOSIS — E66.01 MORBID OBESITY (HCC): ICD-10-CM

## 2018-04-11 LAB
CCP IGA+IGG SERPL IA-ACNC: 7 UNITS (ref 0–19)
CRP SERPL-MCNC: 1.9 MG/L (ref 0–4.9)
ERYTHROCYTE [SEDIMENTATION RATE] IN BLOOD BY WESTERGREN METHOD: 4 MM/HR (ref 0–40)
RHEUMATOID FACT SERPL-ACNC: 12.2 IU/ML (ref 0–13.9)

## 2018-04-11 PROCEDURE — 99214 OFFICE O/P EST MOD 30 MIN: CPT | Performed by: INTERNAL MEDICINE

## 2018-04-11 ASSESSMENT — PAIN SCALES - GENERAL: PAINLEVEL: 3=SLIGHT PAIN

## 2018-04-11 NOTE — LETTER
[email or web address] [facility]  Nila Wilde M.D.  [address]  [place] [address]  Fax: [phone]   Authorization for Release/Disclosure of   Protected Health Information   Name: MONTRELL ROMEO : 1950 SSN: [id number]   Address: [address] [place] [address] Phone:    [phone] (home)    I authorize the entity listed below to release/disclose the PHI below to:   [facility]/Nila Wilde M.D. and Nila Wilde M.D.   Provider or Entity Name:                                                                     Dr. Carlos Enrique Portillo     [facility]   Phone: [phone]      Fax:     Reason for request: continuity of care   Information to be released:    [  ] LAST COLONOSCOPY,  including any PATH REPORT and follow-up  [  ] LAST FIT/COLOGUARD RESULT [  ] LAST DEXA  [  ] LAST MAMMOGRAM  [  ] LAST PAP  [  ] LAST LABS [  ] RETINA EXAM REPORT  [  ] IMMUNIZATION RECORDS  [X] Release all info      [  ] Check here and initial the line next to each item to release ALL health information INCLUDING  _____ Care and treatment for drug and / or alcohol abuse  _____ HIV testing, infection status, or AIDS  _____ Genetic Testing    DATES OF SERVICE OR TIME PERIOD TO BE DISCLOSED: _____________  I understand and acknowledge that:  * This Authorization may be revoked at any time by you in writing, except if your health information has already been used or disclosed.  * Your health information that will be used or disclosed as a result of you signing this authorization could be re-disclosed by the recipient. If this occurs, your re-disclosed health information may no longer be protected by State or Federal laws.  * You may refuse to sign this Authorization. Your refusal will not affect your ability to obtain treatment.  * This Authorization becomes effective upon signing and will  on (date) __________.      If no date is indicated, this Authorization will  one (1) year from the signature  date.    Name: Lorerocio Downey Santiago    Signature:   Date:     4/11/2018       PLEASE FAX REQUESTED RECORDS BACK TO: [phone]

## 2018-04-11 NOTE — PROGRESS NOTES
"date  Chief Complaint   Patient presents with   • Labs Only     follow up, get results on labs       HISTORY OF PRESENT ILLNESS: Patient is a 68 y.o. female established patient who presents today for the following.  Lives in [place] near [place].. Sees Rheum in [place] for RA and ? Systemic sclerosis.    1. Chronic midline thoracic back pain  Patient has been going through back pain that has been mild for several months now. She had a chest x-ray done and no significant abnormalities identified. She takes Advil occasionally and that helps her pain and when she sleeps in her futile which is hard surface she feels better with her back pain. Denies having any radiation of pain to the legs or to the abdomen area.    2. Hypercalcemia  Denies use of calcium supplements. She does not have any abdominal pain or kidney stone issues. Drinks plenty of water.    3. Essential hypertension  Takes blood pressure medication on a regular basis and denies having any headaches or dizziness or blurry vision.    4. Morbid obesity (CMS-HCC)  Patient not very focused on weight loss but she did lose 3 pounds since last visit.    5. Osteopenia of spine  Prior history of osteopenia and no recent DEXA scan. Not very motivated and exercise.      Past Medical History:   Diagnosis Date   • Allergy, unspecified not elsewhere classified    • Anesthesia     Severe PONV   • Arthritis     generalized   • Back pain 6/17/2016   • Childhood asthma 6/17/2016   • Dental disorder     one tooth with a \"pocket\"; harbors bacteria; packed with \"medicine\"   • Dyslipidemia 6/17/2016   • FH: ovarian cancer 6/17/2016   • Hand numbness 6/17/2016   • Herpes zoster 6/17/2016   • HTN (hypertension) 6/17/2016   • Hypercalcemia 6/17/2016   • Hypertension    • Morbid obesity (CMS-HCC) 6/17/2016   • Osteoarthritis of finger 6/17/2016   • Osteopenia 6/17/2016   • Other specified disorder of intestines     constipation/diarrhea   • Pain 02/07/14    RUQ 2/10   • RA " (rheumatoid arthritis) (CMS-HCC) 6/17/2016       Patient Active Problem List    Diagnosis Date Noted   • Polyarthralgia 09/08/2017   • Chronic right shoulder pain 03/01/2017   • FH: ovarian cancer 06/17/2016   • Osteoarthritis of finger 06/17/2016   • Morbid obesity (CMS-HCC) 06/17/2016   • Herpes zoster 06/17/2016   • RA (rheumatoid arthritis) (CMS-HCC) 06/17/2016   • Chronic midline thoracic back pain 06/17/2016   • Hypercalcemia 06/17/2016   • Childhood asthma 06/17/2016   • Osteopenia 06/17/2016   • Essential hypertension 06/17/2016   • Dyslipidemia 06/17/2016   • Other specified disorder of gallbladder 02/10/2014       Allergies:Patient has no known allergies.    Current Outpatient Prescriptions   Medication Sig Dispense Refill   • atorvastatin (LIPITOR) 10 MG Tab TAKE 1 TABLET AT BEDTIME 90 Tab 3   • lisinopril (PRINIVIL) 20 MG Tab TAKE 1 TABLET DAILY 90 Tab 3   • Misc Natural Products (GLUCOSAMINE-CHONDROITIN DS) Tab Take  by mouth.     • tramadol (ULTRAM) 50 MG Tab Take 1-2 Tabs by mouth every 8 hours as needed for Severe Pain. 60 Tab 2   • tizanidine (ZANAFLEX) 4 MG Tab Take 1 Tab by mouth 3 times a day. 30 Tab 0   • aspirin (ASA) 81 MG CHEW Take 81 mg by mouth every 48 hours. Indications: taking mon and thurs     • vitamin D (CHOLECALCIFEROL) 1000 UNIT TABS Take 5,000 Units by mouth every evening. 3 times a week       No current facility-administered medications for this visit.        Social History   Substance Use Topics   • Smoking status: Former Smoker     Packs/day: 1.00     Years: 20.00     Types: Cigarettes     Quit date: 1/1/1985   • Smokeless tobacco: Never Used   • Alcohol use 1.2 oz/week     2 Shots of liquor per week      Comment: soc        Family History   Problem Relation Age of Onset   • Cancer Mother      OVARIAN         Review of Systems    Patient denies Fevers/chills/nausea/vomiting/chest pain/sob/blood in stools/black stools/blood in urine.all other systems are reviewed See HPI    Exam:  " Blood pressure 131/72, pulse (!) 59, temperature 36.3 °C (97.4 °F), height 1.549 m (5' 1\"), weight 97.6 kg (215 lb 3.2 oz), SpO2 97 %. Body mass index is 40.66 kg/m².  Constitutional:  NAD, well appearing.  HEENT:   NC/AT  Cardiovascular: RRR.   No m/r/g. No carotid bruits.       Lungs:   CTAB, no w/r/r, no respiratory distress.  Abdomen: Soft, NT/ND + BS, no masses, no suprapubic tenderness, no hepatomegaly.  Musculoskeletal: No midline tenderness on deep palpation of spine. Able to flex and turn side to side at the lower lumbar level without any difficulty  Extremities:  2+ DP and radial pulses bilaterally.  No c/c/e.  Skin:  Warm and dry.    Neurologic: Alert & oriented x 3, CN II-XII grossly intact, strength and sensation grossly intact.  No focal deficits noted.  Psychiatric:  Affect normal, mood normal, judgment normal.    Assessment/Plan:     1. Chronic midline thoracic back pain  Currently patient has been noticing relief when she sleeps on her future on when needed. Pain is not significant as per her description and Advil helps. We discussed about using Tylenol first and if that doesn't relieve then okay to try Advil for pain relief. Tried heating pad to relax the muscles and stretching exercises. Start with physical therapy one or 2 sessions and then exercise at home  - REFERRAL TO PHYSICAL THERAPY Reason for Therapy: Eval/Treat/Report    2. Hypercalcemia  Currently Calcium coming down 11.1--> 10.7 ---> 10.5. PTH slightly higher then normal and asymptomatic. Will continue to monitor close    3. Essential hypertension  Blood pressure stable for her age and no changes on her lisinopril. Low-salt diet and exercise advised    4. Morbid obesity (CMS-HCC)  Discussed about diet and exercise and even if she uses 4 pounds she will come down to a category of obesity rather than morbid obesity. Patient is willing to give it a try    5. Osteopenia of spine  Check DEXA scan to evaluate for osteoporosis given her age. " Advised weightbearing exercise and vitamin D supplements  - DS-BONE DENSITY STUDY (DEXA); Future      All imaging results and lab results and consult notes are reviewed at this visit.  Followup: Return in about 6 months (around 10/11/2018).    Please note that this dictation was created using voice recognition software. I have made every reasonable attempt to correct obvious errors, but I expect that there are errors of grammar and possibly content that I did not discover before finalizing the note.

## 2018-04-11 NOTE — LETTER
[facility]  Nila Wilde M.D.  [address]  [place] [address]  Fax: [phone]   Authorization for Release/Disclosure of   Protected Health Information   Name: MONTRELL ROMEO : 1950 SSN: [id number]   Address: [address] [place] [address] Phone:    [phone] (home)    I authorize the entity listed below to release/disclose the PHI below to:   [facility]/Nila Wilde M.D. and Nila Wilde M.D.   Provider or Entity Name:                     [facility] - Wale Funes D.O.     Address   City, State, Zip   Phone: [phone]      Fax: [phone]     Reason for request: continuity of care   Information to be released:    [  ] LAST COLONOSCOPY,  including any PATH REPORT and follow-up  [  ] LAST FIT/COLOGUARD RESULT [  ] LAST DEXA  [  ] LAST MAMMOGRAM  [  ] LAST PAP  [  ] LAST LABS [  ] RETINA EXAM REPORT  [  ] IMMUNIZATION RECORDS  [X] Release all info      [  ] Check here and initial the line next to each item to release ALL health information INCLUDING  _____ Care and treatment for drug and / or alcohol abuse  _____ HIV testing, infection status, or AIDS  _____ Genetic Testing    DATES OF SERVICE OR TIME PERIOD TO BE DISCLOSED: _____________  I understand and acknowledge that:  * This Authorization may be revoked at any time by you in writing, except if your health information has already been used or disclosed.  * Your health information that will be used or disclosed as a result of you signing this authorization could be re-disclosed by the recipient. If this occurs, your re-disclosed health information may no longer be protected by State or Federal laws.  * You may refuse to sign this Authorization. Your refusal will not affect your ability to obtain treatment.  * This Authorization becomes effective upon signing and will  on (date) __________.      If no date is indicated, this Authorization will  one (1) year from the  signature date.    Name: Lore Downey Santiago    Signature:   Date:     4/11/2018       PLEASE FAX REQUESTED RECORDS BACK TO: [phone]

## 2018-04-11 NOTE — PATIENT INSTRUCTIONS
Chronic Back Pain  When back pain lasts longer than 3 months, it is called chronic back pain. The cause of your back pain may not be known. Some common causes include:  · Wear and tear (degenerative disease) of the bones, ligaments, or disks in your back.  · Inflammation and stiffness in your back (arthritis).  People who have chronic back pain often go through certain periods in which the pain is more intense (flare-ups). Many people can learn to manage the pain with home care.  Follow these instructions at home:  Pay attention to any changes in your symptoms. Take these actions to help with your pain:  Activity  · Avoid bending and activities that make the problem worse.  · Do not sit or  one place for long periods of time.  · Take brief periods of rest throughout the day. This will reduce your pain. Resting in a lying or standing position is usually better than sitting to rest.  · When you are resting for longer periods, mix in some mild activity or stretching between periods of rest. This will help to prevent stiffness and pain.  · Get regular exercise. Ask your health care provider what activities are safe for you.  · Do not lift anything that is heavier than 10 lb (4.5 kg). Always use proper lifting technique, which includes:  ¨ Bending your knees.  ¨ Keeping the load close to your body.  ¨ Avoiding twisting.  Managing pain  · If directed, apply ice to the painful area. Your health care provider may recommend applying ice during the first 24-48 hours after a flare-up begins.  ¨ Put ice in a plastic bag.  ¨ Place a towel between your skin and the bag.  ¨ Leave the ice on for 20 minutes, 2-3 times per day.  · After icing, apply heat to the affected area as often as told by your health care provider. Use the heat source that your health care provider recommends, such as a moist heat pack or a heating pad.  ¨ Place a towel between your skin and the heat source.  ¨ Leave the heat on for 20-30  minutes.  ¨ Remove the heat if your skin turns bright red. This is especially important if you are unable to feel pain, heat, or cold. You may have a greater risk of getting burned.  · Try soaking in a warm tub.  · Take over-the-counter and prescription medicines only as told by your health care provider.  · Keep all follow-up visits as told by your health care provider. This is important.  Contact a health care provider if:  · You have pain that is not relieved with rest or medicine.  Get help right away if:  · You have weakness or numbness in one or both of your legs or feet.  · You have trouble controlling your bladder or your bowels.  · You have nausea or vomiting.  · You have pain in your abdomen.  · You have shortness of breath or you faint.  This information is not intended to replace advice given to you by your health care provider. Make sure you discuss any questions you have with your health care provider.  Document Released: 01/25/2006 Document Revised: 04/27/2017 Document Reviewed: 06/06/2016  Elsevier Interactive Patient Education © 2017 [employer]

## 2018-07-27 ENCOUNTER — TELEPHONE (OUTPATIENT)
Dept: INTERNAL MEDICINE | Facility: MEDICAL CENTER | Age: 68
End: 2018-07-27

## 2018-07-27 NOTE — TELEPHONE ENCOUNTER
1. Caller Name: Lore Henderson                                         Call Back Number: [phone] (home)       Patient approves a detailed voicemail message: yes    Pt called and left vm stating that she had done her bone density scan and wanted the results of them.    I called and let her know that we have not gotten anything, she states she has gotten the scan done outside of [place] and will try to see if she will be able to obtain the results.

## 2018-09-21 DIAGNOSIS — E78.5 DYSLIPIDEMIA: ICD-10-CM

## 2018-09-21 DIAGNOSIS — I10 ESSENTIAL HYPERTENSION: ICD-10-CM

## 2018-09-24 RX ORDER — LISINOPRIL 20 MG/1
TABLET ORAL
Qty: 90 TAB | Refills: 1 | Status: SHIPPED | OUTPATIENT
Start: 2018-09-24 | End: 2019-01-28 | Stop reason: SDUPTHER

## 2018-09-24 RX ORDER — ATORVASTATIN CALCIUM 10 MG/1
TABLET, FILM COATED ORAL
Qty: 90 TAB | Refills: 1 | Status: SHIPPED | OUTPATIENT
Start: 2018-09-24 | End: 2019-03-13 | Stop reason: SDUPTHER

## 2018-09-24 NOTE — TELEPHONE ENCOUNTER
Last seen: 04/11/18 by Dr. Wilde  Next appt: 10/11/18 with Dr. Wilde    Was the patient seen in the last year in this department? Yes   Does patient have an active prescription for medications requested? No   Received Request Via: Pharmacy

## 2018-09-25 ENCOUNTER — TELEPHONE (OUTPATIENT)
Dept: INTERNAL MEDICINE | Facility: MEDICAL CENTER | Age: 68
End: 2018-09-25

## 2018-09-25 DIAGNOSIS — E83.52 HYPERCALCEMIA: ICD-10-CM

## 2018-09-25 DIAGNOSIS — M05.739 RHEUMATOID ARTHRITIS INVOLVING WRIST WITH POSITIVE RHEUMATOID FACTOR, UNSPECIFIED LATERALITY (HCC): ICD-10-CM

## 2018-09-25 NOTE — TELEPHONE ENCOUNTER
Pt called and left vm stating that she wanted to know if she will need any labs done before her next appt on 10/11/18 with PCP.

## 2018-09-26 NOTE — TELEPHONE ENCOUNTER
Orders were placed pt notified would like for us to mail to [address] [facility]. [id number] lab orders printed and mailed

## 2018-10-10 LAB
25(OH)D3+25(OH)D2 SERPL-MCNC: 40.5 NG/ML (ref 30–100)
ALBUMIN SERPL-MCNC: 4.9 G/DL (ref 3.6–4.8)
ALBUMIN/GLOB SERPL: 2.1 {RATIO} (ref 1.2–2.2)
ALP SERPL-CCNC: 91 IU/L (ref 39–117)
ALT SERPL-CCNC: 22 IU/L (ref 0–32)
AST SERPL-CCNC: 19 IU/L (ref 0–40)
BASOPHILS # BLD AUTO: 0 X10E3/UL (ref 0–0.2)
BASOPHILS NFR BLD AUTO: 0 %
BILIRUB SERPL-MCNC: 1.2 MG/DL (ref 0–1.2)
BUN SERPL-MCNC: 15 MG/DL (ref 8–27)
BUN/CREAT SERPL: 17 (ref 12–28)
CALCIUM SERPL-MCNC: 11 MG/DL (ref 8.7–10.3)
CHLORIDE SERPL-SCNC: 107 MMOL/L (ref 96–106)
CO2 SERPL-SCNC: 21 MMOL/L (ref 20–29)
CREAT SERPL-MCNC: 0.89 MG/DL (ref 0.57–1)
EOSINOPHIL # BLD AUTO: 0.1 X10E3/UL (ref 0–0.4)
EOSINOPHIL NFR BLD AUTO: 2 %
ERYTHROCYTE [DISTWIDTH] IN BLOOD BY AUTOMATED COUNT: 13.6 % (ref 12.3–15.4)
GLOBULIN SER CALC-MCNC: 2.3 G/DL (ref 1.5–4.5)
GLUCOSE SERPL-MCNC: 99 MG/DL (ref 65–99)
HCT VFR BLD AUTO: 44.4 % (ref 34–46.6)
HGB BLD-MCNC: 14.9 G/DL (ref 11.1–15.9)
IF AFRICAN AMERICAN  100797: 78 ML/MIN/1.73
IF NON AFRICAN AMER 100791: 68 ML/MIN/1.73
IMM GRANULOCYTES # BLD: 0 X10E3/UL (ref 0–0.1)
IMM GRANULOCYTES NFR BLD: 0 %
IMMATURE CELLS  115398: NORMAL
LYMPHOCYTES # BLD AUTO: 2.1 X10E3/UL (ref 0.7–3.1)
LYMPHOCYTES NFR BLD AUTO: 34 %
MCH RBC QN AUTO: 31.5 PG (ref 26.6–33)
MCHC RBC AUTO-ENTMCNC: 33.6 G/DL (ref 31.5–35.7)
MCV RBC AUTO: 94 FL (ref 79–97)
MONOCYTES # BLD AUTO: 0.7 X10E3/UL (ref 0.1–0.9)
MONOCYTES NFR BLD AUTO: 11 %
MORPHOLOGY BLD-IMP: NORMAL
NEUTROPHILS # BLD AUTO: 3.3 X10E3/UL (ref 1.4–7)
NEUTROPHILS NFR BLD AUTO: 53 %
NRBC BLD AUTO-RTO: NORMAL %
PLATELET # BLD AUTO: 209 X10E3/UL (ref 150–379)
POTASSIUM SERPL-SCNC: 4.7 MMOL/L (ref 3.5–5.2)
PROT SERPL-MCNC: 7.2 G/DL (ref 6–8.5)
PTH-INTACT SERPL-MCNC: 64 PG/ML (ref 15–65)
RBC # BLD AUTO: 4.73 X10E6/UL (ref 3.77–5.28)
SODIUM SERPL-SCNC: 143 MMOL/L (ref 134–144)
WBC # BLD AUTO: 6.1 X10E3/UL (ref 3.4–10.8)

## 2018-10-11 ENCOUNTER — OFFICE VISIT (OUTPATIENT)
Dept: INTERNAL MEDICINE | Facility: MEDICAL CENTER | Age: 68
End: 2018-10-11
Payer: MEDICARE

## 2018-10-11 VITALS
HEIGHT: 61 IN | HEART RATE: 67 BPM | TEMPERATURE: 97.4 F | BODY MASS INDEX: 39.84 KG/M2 | DIASTOLIC BLOOD PRESSURE: 70 MMHG | OXYGEN SATURATION: 98 % | SYSTOLIC BLOOD PRESSURE: 146 MMHG | WEIGHT: 211 LBS

## 2018-10-11 DIAGNOSIS — I10 ESSENTIAL HYPERTENSION: ICD-10-CM

## 2018-10-11 DIAGNOSIS — Z12.11 SCREEN FOR COLON CANCER: ICD-10-CM

## 2018-10-11 DIAGNOSIS — Z23 NEED FOR VACCINATION: ICD-10-CM

## 2018-10-11 DIAGNOSIS — E83.52 HYPERCALCEMIA: ICD-10-CM

## 2018-10-11 LAB
ADDITIONAL TESTS REQ 001315: NORMAL
VERBAL ORDER SEE BELOW:   978113: NORMAL

## 2018-10-11 PROCEDURE — 90662 IIV NO PRSV INCREASED AG IM: CPT | Performed by: INTERNAL MEDICINE

## 2018-10-11 PROCEDURE — G0009 ADMIN PNEUMOCOCCAL VACCINE: HCPCS | Performed by: INTERNAL MEDICINE

## 2018-10-11 PROCEDURE — 99214 OFFICE O/P EST MOD 30 MIN: CPT | Mod: 25 | Performed by: INTERNAL MEDICINE

## 2018-10-11 PROCEDURE — G0008 ADMIN INFLUENZA VIRUS VAC: HCPCS | Performed by: INTERNAL MEDICINE

## 2018-10-11 PROCEDURE — 90670 PCV13 VACCINE IM: CPT | Performed by: INTERNAL MEDICINE

## 2018-10-11 NOTE — PATIENT INSTRUCTIONS
Hypercalcemia  Introduction  Hypercalcemia is having too much calcium in the blood. The body needs calcium to make bones and keep them strong. Calcium also helps the muscles, nerves, brain, and heart work the way they should.  Most of the calcium in the body is in the bones. There is also some calcium in the blood. Hypercalcemia can happen when calcium comes out of the bones, or when the kidneys are not able to remove calcium from the blood. Hypercalcemia can be mild or severe.  What are the causes?  There are many possible causes of hypercalcemia. Common causes include:  · Hyperparathyroidism. This is a condition in which the body produces too much parathyroid hormone. There are four parathyroid glands in your neck. These glands produce a chemical messenger (hormone) that helps the body absorb calcium from foods and helps your bones release calcium.  · Certain kinds of cancer, such as lung cancer, breast cancer, or myeloma.  Less common causes of hypercalcemia include:  · Getting too much calcium or vitamin D from your diet.  · Kidney failure.  · Hyperthyroidism.  · Being on bed rest for a long time.  · Certain medicines.  · Infections.  · Sarcoidosis.  What increases the risk?  This condition is more likely to develop in:  · Women.  · People who are 60 years or older.  · People who have a family history of hypercalcemia.  What are the signs or symptoms?  Mild hypercalcemia that starts slowly may not cause symptoms. Severe, sudden hypercalcemia is more likely to cause symptoms, such as:  · Loss of appetite.  · Increased thirst and frequent urination.  · Fatigue.  · Nausea and vomiting.  · Headache.  · Abdominal pain.  · Muscle pain, twitching, or weakness.  · Constipation.  · Blood in the urine.  · Pain in the side of the back (flank pain).  · Anxiety, confusion, or depression.  · Irregular heartbeat (arrhythmia).  · Loss of consciousness.  How is this diagnosed?  This condition may be diagnosed based on:  · Your  symptoms.  · Blood tests.  · Urine tests.  · X-rays.  · Ultrasound.  · MRI.  · CT scan.  How is this treated?  Treatment for hypercalcemia depends on the cause. Treatment may include:  · Receiving fluids through an IV tube.  · Medicines that keep calcium levels steady after receiving fluids (loop diuretics).  · Medicines that keep calcium in your bones (bisphosphonates).  · Medicines that lower the calcium level in your blood.  · Surgery to remove overactive parathyroid glands.  Follow these instructions at home:  · Take over-the-counter and prescription medicines only as told by your health care provider.  · Follow instructions from your health care provider about eating or drinking restrictions.  · Drink enough fluid to keep your urine clear or pale yellow.  · Stay active. Weight-bearing exercise helps to keep calcium in your bones. Follow instructions from your health care provider about what type and level of exercise is safe for you.  · Keep all follow-up visits as told by your health care provider. This is important.  Contact a health care provider if:  · You have a fever.  · You have flank or abdominal pain that is getting worse.  Get help right away if:  · You have severe abdominal or flank pain.  · You have chest pain.  · You have trouble breathing.  · You become very confused and sleepy.  · You lose consciousness.  This information is not intended to replace advice given to you by your health care provider. Make sure you discuss any questions you have with your health care provider.  Document Released: 03/03/2006 Document Revised: 05/25/2017 Document Reviewed: 05/04/2016  © 2017 [employer]

## 2018-10-11 NOTE — PROGRESS NOTES
"date  Chief Complaint   Patient presents with   • Labs Only     Lab Work Review       HISTORY OF PRESENT ILLNESS: Patient is a 68 y.o. female established patient who presents today for the following.  She uses KT tape that helps her back pain and following up with a spine doctor in [place] where she lives.    1. Need for vaccination  Interested in getting flu shot today    2. Essential hypertension  Taking her blood pressure medication lisinopril on a regular basis and denies having any side effects. No headaches dizziness or blurry vision    3. Hypercalcemia  Chronic issue with high calcium levels and patient denies having any kidney stones issue or pain while urination or abdominal pain issues.    4. Screen for colon cancer  Patient has not had colonoscopy last 10 years and asking if she can just do fit test today      Past Medical History:   Diagnosis Date   • Allergy, unspecified not elsewhere classified    • Anesthesia     Severe PONV   • Arthritis     generalized   • Back pain 6/17/2016   • Childhood asthma 6/17/2016   • Dental disorder     one tooth with a \"pocket\"; harbors bacteria; packed with \"medicine\"   • Dyslipidemia 6/17/2016   • FH: ovarian cancer 6/17/2016   • Hand numbness 6/17/2016   • Herpes zoster 6/17/2016   • HTN (hypertension) 6/17/2016   • Hypercalcemia 6/17/2016   • Hypertension    • Morbid obesity (HCC) 6/17/2016   • Osteoarthritis of finger 6/17/2016   • Osteopenia 6/17/2016   • Other specified disorder of intestines     constipation/diarrhea   • Pain 02/07/14    RUQ 2/10   • RA (rheumatoid arthritis) ([facility]) 6/17/2016       Patient Active Problem List    Diagnosis Date Noted   • Polyarthralgia 09/08/2017   • Chronic right shoulder pain 03/01/2017   • FH: ovarian cancer 06/17/2016   • Osteoarthritis of finger 06/17/2016   • Morbid obesity (HCC) 06/17/2016   • Herpes zoster 06/17/2016   • RA (rheumatoid arthritis) (CMS-[facility]) 06/17/2016   • Chronic midline thoracic back pain 06/17/2016   • " "Hypercalcemia 06/17/2016   • Childhood asthma 06/17/2016   • Osteopenia 06/17/2016   • Essential hypertension 06/17/2016   • Dyslipidemia 06/17/2016   • Other specified disorder of gallbladder 02/10/2014       Allergies:Patient has no known allergies.    Current Outpatient Prescriptions   Medication Sig Dispense Refill   • CO ENZYME Q-10 PO Take  by mouth.     • atorvastatin (LIPITOR) 10 MG Tab TAKE 1 TABLET AT BEDTIME 90 Tab 1   • lisinopril (PRINIVIL) 20 MG Tab TAKE 1 TABLET EVERY DAY 90 Tab 1   • tramadol (ULTRAM) 50 MG Tab Take 1-2 Tabs by mouth every 8 hours as needed for Severe Pain. 60 Tab 2   • tizanidine (ZANAFLEX) 4 MG Tab Take 1 Tab by mouth 3 times a day. 30 Tab 0   • aspirin (ASA) 81 MG CHEW Take 81 mg by mouth every 48 hours. Indications: taking mon and thurs     • vitamin D (CHOLECALCIFEROL) 1000 UNIT TABS Take 5,000 Units by mouth every evening. 3 times a week     • Misc Natural Products (GLUCOSAMINE-CHONDROITIN DS) Tab Take  by mouth.       No current facility-administered medications for this visit.        Social History   Substance Use Topics   • Smoking status: Former Smoker     Packs/day: 1.00     Years: 20.00     Types: Cigarettes     Quit date: 1/1/1985   • Smokeless tobacco: Never Used   • Alcohol use 1.2 oz/week     2 Shots of liquor per week      Comment: soc        Family History   Problem Relation Age of Onset   • Cancer Mother         OVARIAN         Review of Systems   Patient denies Fevers/chills/nausea/vomiting/chest pain/sob/blood in stools/black stools/blood in urine.all other systems are reviewed See HPI    Exam:  Blood pressure 146/70, pulse 67, temperature 36.3 °C (97.4 °F), temperature source Temporal, height 1.549 m (5' 1\"), weight 95.7 kg (211 lb), SpO2 98 %, not currently breastfeeding. Body mass index is 39.87 kg/m².  Constitutional:  NAD, well appearing.  HEENT:   NC/AT  Cardiovascular: RRR.   No m/r/g. No carotid bruits.       Lungs:   CTAB, no w/r/r, no respiratory " distress.  Abdomen: Soft, NT/ND + BS, no masses, no suprapubic tenderness, no hepatomegaly.  Extremities:  2+ DP and radial pulses bilaterally.  No c/c/e.  Skin:  Warm and dry.    Neurologic: Alert & oriented x 3, CN II-XII grossly intact, strength and sensation grossly intact.  No focal deficits noted.  Psychiatric:  Affect normal, mood normal, judgment normal.    Assessment/Plan:     1. Need for vaccination  , Flu shot given today in the office as well as Prevnar 13 and she is due for this vaccine  - INFLUENZA VACCINE, HIGH DOSE (65+ ONLY)  - Prevnar 13 PCV-13    2. Essential hypertension  Blood pressure slightly running high but will continue the same medication lisinopril 20 unless she has elevated numbers at home then we will titrate it up    3. Hypercalcemia  Patient has chronic hypercalcemia and currently her level is at 11. PTH WNL range. She is not on any medications that can increase calcium levels. Will check TSH.   - TSH; Future    4. Screen for colon cancer  Pt advised colonoscopy is the best way to identify cancer and better to do this given her high calcium levels and also due for screening  - REFERRAL TO GI FOR COLONOSCOPY      All imaging results and lab results and consult notes are reviewed at this visit.  Followup: Return in about 4 months (around 2/11/2019).    Please note that this dictation was created using voice recognition software. I have made every reasonable attempt to correct obvious errors, but I expect that there are errors of grammar and possibly content that I did not discover before finalizing the note.

## 2018-10-11 NOTE — NON-PROVIDER
"Lore Henderson is a 68 y.o. female here for a non-provider visit for:   PREVNAR (PCV13) 1 of 1    Reason for immunization: Overdue/Provider Recommended  Immunization records indicate need for vaccine: Yes, confirmed with Epic  Minimum interval has been met for this vaccine: Yes  ABN completed: Not Indicated    Order and dose verified by: [facility]  VIS Dated  11/05/15 was given to patient: Yes  All IAC Questionnaire questions were answered \"No.\"    Patient tolerated injection and no adverse effects were observed or reported: Yes    Pt scheduled for next dose in series: Not Indicated          Lore Henderson is a 68 y.o. female here for a non-provider visit for:   FLU    Reason for immunization: Annual Flu Vaccine  Immunization records indicate need for vaccine: Yes, confirmed with Epic  Minimum interval has been met for this vaccine: Yes  ABN completed: Not Indicated    Order and dose verified by: [facility]  VIS Dated  08/07/15 was given to patient: Yes  All IAC Questionnaire questions were answered \"No.\"    Patient tolerated injection and no adverse effects were observed or reported: Yes    Pt scheduled for next dose in series: Not Indicated    "

## 2018-10-12 LAB
TSH SERPL DL<=0.005 MIU/L-ACNC: 0.7 UIU/ML (ref 0.45–4.5)
WRITTEN AUTHORIZATION   977900: NORMAL

## 2018-11-01 ENCOUNTER — TELEPHONE (OUTPATIENT)
Dept: INTERNAL MEDICINE | Facility: MEDICAL CENTER | Age: 68
End: 2018-11-01

## 2018-11-01 DIAGNOSIS — E83.52 HYPERCALCEMIA: ICD-10-CM

## 2018-11-01 NOTE — TELEPHONE ENCOUNTER
Please let patient know I will refer her to endocrinology as her parathyroid has been fluctuating and has persistent elevated calcium levels

## 2018-11-01 NOTE — TELEPHONE ENCOUNTER
Pt called and would like to know what is the next step b/c she states shes worried abt her calcium was high last visit. Please advise.

## 2019-01-28 DIAGNOSIS — I10 ESSENTIAL HYPERTENSION: ICD-10-CM

## 2019-01-28 NOTE — TELEPHONE ENCOUNTER
PT SEEN: 10/11/18 WITH Dr. Wilde NEXT APPT 4/10/19 WITH Dr. Wilde  Was the patient seen in the last year in this department? Yes     Does patient have an active prescription for medications requested? No     Received Request Via: Pharmacy

## 2019-01-29 RX ORDER — LISINOPRIL 20 MG/1
TABLET ORAL
Qty: 90 TAB | Refills: 1 | Status: SHIPPED | OUTPATIENT
Start: 2019-01-29 | End: 2019-04-10 | Stop reason: SDUPTHER

## 2019-03-13 DIAGNOSIS — E78.5 DYSLIPIDEMIA: ICD-10-CM

## 2019-03-13 RX ORDER — ATORVASTATIN CALCIUM 10 MG/1
TABLET, FILM COATED ORAL
Qty: 90 TAB | Refills: 1 | Status: SHIPPED | OUTPATIENT
Start: 2019-03-13 | End: 2019-04-10 | Stop reason: SDUPTHER

## 2019-04-08 ENCOUNTER — HOSPITAL ENCOUNTER (OUTPATIENT)
Dept: RADIOLOGY | Facility: MEDICAL CENTER | Age: 69
End: 2019-04-08
Attending: INTERNAL MEDICINE
Payer: MEDICARE

## 2019-04-08 DIAGNOSIS — Z12.31 VISIT FOR SCREENING MAMMOGRAM: ICD-10-CM

## 2019-04-08 PROCEDURE — 77063 BREAST TOMOSYNTHESIS BI: CPT

## 2019-04-08 NOTE — PROGRESS NOTES
New Patient Consult Note  Primary care physician: Nila Wilde M.D.    Reason for consult: High calcium levels.    HPI:  Lore Henderson is a 69 y.o. old patient who comes in today for evaluation of  High calcium levels, history of Vitamin D Deficiency, and Hypertension.  She is currently taking Vitamin D 5000 units 3 times/week.  She has been under a lot of stress over the last month with her sister being sick and has gained weight.    ROS:  Constitutional: Increase stress and weight gain   Cardiac: No palpitations or racing heart  Resp: No shortness of breath  Neuro: No numbness or tinging in feet  Endo: No heat or cold intolerance, no polyuria or polydipsia  All other systems were reviewed and were negative.    Past Medical History:  Patient Active Problem List    Diagnosis Date Noted   • Polyarthralgia 09/08/2017   • Chronic right shoulder pain 03/01/2017   • FH: ovarian cancer 06/17/2016   • Osteoarthritis of finger 06/17/2016   • Morbid obesity (HCC) 06/17/2016   • Herpes zoster 06/17/2016   • RA (rheumatoid arthritis) (CMS-HCC) 06/17/2016   • Chronic midline thoracic back pain 06/17/2016   • Hypercalcemia 06/17/2016   • Childhood asthma 06/17/2016   • Osteopenia 06/17/2016   • Essential hypertension 06/17/2016   • Dyslipidemia 06/17/2016   • Other specified disorder of gallbladder 02/10/2014       Past Surgical History:  Past Surgical History:   Procedure Laterality Date   • BELKYS BY LAPAROSCOPY  2/10/2014    Performed by Doug Pool M.D. at [facility]   • OTHER ORTHOPEDIC SURGERY  1972    toe surgery   • KNEE ARTHROSCOPY  2000/1997       Allergies:  Patient has no known allergies.    Social History:  Social History     Social History   • Marital status: Single     Spouse name: N/A   • Number of children: N/A   • Years of education: N/A     Occupational History   • Not on file.     Social History Main Topics   • Smoking status: Former Smoker     Packs/day: 1.00     Years: 20.00  "    Types: Cigarettes     Quit date: 1/1/1985   • Smokeless tobacco: Never Used   • Alcohol use 1.2 oz/week     2 Shots of liquor per week      Comment: soc    • Drug use: No      Comment: OCCAS. MARIJUANA   • Sexual activity: Not on file     Other Topics Concern   • Not on file     Social History Narrative   • No narrative on file       Family History:  Family History   Problem Relation Age of Onset   • Cancer Mother         OVARIAN       Medications:    Current Outpatient Prescriptions:   •  CO ENZYME Q-10 PO, Take 300 mg by mouth., Disp: , Rfl:   •  aspirin (ASA) 81 MG CHEW, Take 81 mg by mouth every 48 hours. Indications: taking mon and thurs, Disp: , Rfl:   •  vitamin D (CHOLECALCIFEROL) 1000 UNIT TABS, Take 5,000 Units by mouth every evening. 3 times a week, Disp: , Rfl:   •  lisinopril (PRINIVIL) 20 MG Tab, Take 1 Tab by mouth every day., Disp: 90 Tab, Rfl: 1  •  atorvastatin (LIPITOR) 10 MG Tab, TAKE 1 TABLET AT BEDTIME, Disp: 90 Tab, Rfl: 1  •  tramadol (ULTRAM) 50 MG Tab, Take 1-2 Tabs by mouth every 8 hours as needed for Severe Pain., Disp: 60 Tab, Rfl: 2  •  tizanidine (ZANAFLEX) 4 MG Tab, Take 1 Tab by mouth 3 times a day., Disp: 30 Tab, Rfl: 0    Labs: Reviewed    Physical Examination:  Vital signs: /70   Pulse 86   Ht 1.549 m (5' 1\")   Wt 99.8 kg (220 lb)   SpO2 95%   BMI 41.57 kg/m²  Body mass index is 41.57 kg/m².  [place].  General: No apparent distress, cooperative  Eyes: No scleral icterus or discharge  ENMT: Normal on external inspection of nose, lips, normal thyroid exam  Neck: No abnormal masses on inspection  Resp: Normal effort, clear to auscultation bilaterally   CVS: Regular rate and rhythm, S1 S2 normal, no murmur   Extremities: No edema  Abdomen: abdominal obesity present  Neuro: Alert and oriented  Skin: No rash  Psych: Normal mood and affect, intact memory and able to make informed decisions    Assessment and Plan:    1. High calcium levels   will obtain detailed " calcium and parathyroid profile for further evaluation.     2. Vitamin D deficiency   cont vit D for now. Will adjust dose based on calcium and parathyroid profile.     Return in about 2 months (around 6/9/2019).    Thank you for allowing me to participate in the care of this patient.    Marco A Morris  04/09/19  This note was scribed by Sandhya Hogan RN CDE  CC:   Nila Wilde M.D.    This note was created using voice recognition software (Dragon). The accuracy of the dictation is limited by the abilities of the software. I have reviewed the note prior to signing, however some errors in grammar and context are still possible. If you have any questions related to this note please do not hesitate to contact our office.

## 2019-04-09 ENCOUNTER — OFFICE VISIT (OUTPATIENT)
Dept: ENDOCRINOLOGY | Facility: MEDICAL CENTER | Age: 69
End: 2019-04-09
Payer: MEDICARE

## 2019-04-09 VITALS
HEART RATE: 86 BPM | HEIGHT: 61 IN | OXYGEN SATURATION: 95 % | BODY MASS INDEX: 41.54 KG/M2 | WEIGHT: 220 LBS | SYSTOLIC BLOOD PRESSURE: 102 MMHG | DIASTOLIC BLOOD PRESSURE: 70 MMHG

## 2019-04-09 DIAGNOSIS — E03.9 HYPOTHYROIDISM, UNSPECIFIED TYPE: ICD-10-CM

## 2019-04-09 DIAGNOSIS — E83.52 HIGH CALCIUM LEVELS: ICD-10-CM

## 2019-04-09 DIAGNOSIS — E04.2 GOITER, NONTOXIC, MULTINODULAR: ICD-10-CM

## 2019-04-09 DIAGNOSIS — E55.9 VITAMIN D DEFICIENCY: ICD-10-CM

## 2019-04-09 DIAGNOSIS — E83.52 HYPERCALCEMIA: ICD-10-CM

## 2019-04-09 DIAGNOSIS — E53.8 VITAMIN B 12 DEFICIENCY: ICD-10-CM

## 2019-04-09 DIAGNOSIS — E21.3 HYPERPARATHYROIDISM (HCC): ICD-10-CM

## 2019-04-09 LAB — TSH SERPL DL<=0.005 MIU/L-ACNC: 0.88 UIU/ML (ref 0.45–4.5)

## 2019-04-09 PROCEDURE — 99204 OFFICE O/P NEW MOD 45 MIN: CPT | Performed by: INTERNAL MEDICINE

## 2019-04-10 ENCOUNTER — OFFICE VISIT (OUTPATIENT)
Dept: INTERNAL MEDICINE | Facility: MEDICAL CENTER | Age: 69
End: 2019-04-10
Payer: MEDICARE

## 2019-04-10 VITALS
HEART RATE: 60 BPM | DIASTOLIC BLOOD PRESSURE: 67 MMHG | BODY MASS INDEX: 40.97 KG/M2 | HEIGHT: 61 IN | WEIGHT: 217 LBS | SYSTOLIC BLOOD PRESSURE: 145 MMHG | TEMPERATURE: 97.7 F | OXYGEN SATURATION: 95 %

## 2019-04-10 DIAGNOSIS — E78.5 DYSLIPIDEMIA: ICD-10-CM

## 2019-04-10 DIAGNOSIS — E66.01 MORBID OBESITY (HCC): ICD-10-CM

## 2019-04-10 DIAGNOSIS — E83.52 HYPERCALCEMIA: ICD-10-CM

## 2019-04-10 DIAGNOSIS — I10 ESSENTIAL HYPERTENSION: ICD-10-CM

## 2019-04-10 PROCEDURE — 99214 OFFICE O/P EST MOD 30 MIN: CPT | Performed by: INTERNAL MEDICINE

## 2019-04-10 RX ORDER — ATORVASTATIN CALCIUM 10 MG/1
TABLET, FILM COATED ORAL
Qty: 90 TAB | Refills: 1 | Status: SHIPPED | OUTPATIENT
Start: 2019-04-10

## 2019-04-10 RX ORDER — LISINOPRIL 20 MG/1
20 TABLET ORAL
Qty: 90 TAB | Refills: 1 | Status: SHIPPED | OUTPATIENT
Start: 2019-04-10

## 2019-04-10 NOTE — PROGRESS NOTES
"date  Chief Complaint   Patient presents with   • Hypertension     6 mth Fv       HISTORY OF PRESENT ILLNESS: Patient is a 69 y.o. female established patient who presents today for the following.      1. Essential hypertension  Patient has been taking her lisinopril 20 mg on a regular basis and doing well without any concerns.  States that she did gain weight when she was taking care of her sister in [place] and currently she is trying to get back into eating healthy and [place]  Denies headaches dizziness or blurry vision    2. Dyslipidemia  Has been taking her Lipitor 20 mg on a regular basis and denies having any myalgias.  Trying to eat healthy and staying active    3. Hypercalcemia  Patient has seen endocrinologist in getting further evaluation for her hypercalcemia.  She does not have any stones issues but further evaluation for parathyroidectomy    4. Morbid obesity (HCC)  Watching her diet and trying to stay active      Past Medical History:   Diagnosis Date   • Allergy, unspecified not elsewhere classified    • Anesthesia     Severe PONV   • Arthritis     generalized   • Back pain 6/17/2016   • Childhood asthma 6/17/2016   • Dental disorder     one tooth with a \"pocket\"; harbors bacteria; packed with \"medicine\"   • Dyslipidemia 6/17/2016   • FH: ovarian cancer 6/17/2016   • Hand numbness 6/17/2016   • Herpes zoster 6/17/2016   • HTN (hypertension) 6/17/2016   • Hypercalcemia 6/17/2016   • Hypertension    • Morbid obesity (HCC) 6/17/2016   • Osteoarthritis of finger 6/17/2016   • Osteopenia 6/17/2016   • Other specified disorder of intestines     constipation/diarrhea   • Pain 02/07/14    RUQ 2/10   • RA (rheumatoid arthritis) (HCC) 6/17/2016       Patient Active Problem List    Diagnosis Date Noted   • Polyarthralgia 09/08/2017   • Chronic right shoulder pain 03/01/2017   • FH: ovarian cancer 06/17/2016   • Osteoarthritis of finger 06/17/2016   • Morbid obesity (HCC) 06/17/2016   • " "Herpes zoster 06/17/2016   • RA (rheumatoid arthritis) ([facility]) 06/17/2016   • Chronic midline thoracic back pain 06/17/2016   • Hypercalcemia 06/17/2016   • Childhood asthma 06/17/2016   • Osteopenia 06/17/2016   • Essential hypertension 06/17/2016   • Dyslipidemia 06/17/2016   • Other specified disorder of gallbladder 02/10/2014       Allergies:Patient has no known allergies.    Current Outpatient Prescriptions   Medication Sig Dispense Refill   • lisinopril (PRINIVIL) 20 MG Tab Take 1 Tab by mouth every day. 90 Tab 1   • atorvastatin (LIPITOR) 10 MG Tab TAKE 1 TABLET AT BEDTIME 90 Tab 1   • CO ENZYME Q-10 PO Take 300 mg by mouth.     • tramadol (ULTRAM) 50 MG Tab Take 1-2 Tabs by mouth every 8 hours as needed for Severe Pain. 60 Tab 2   • tizanidine (ZANAFLEX) 4 MG Tab Take 1 Tab by mouth 3 times a day. 30 Tab 0   • aspirin (ASA) 81 MG CHEW Take 81 mg by mouth every 48 hours. Indications: taking mon and thurs     • vitamin D (CHOLECALCIFEROL) 1000 UNIT TABS Take 5,000 Units by mouth every evening. 3 times a week     • Misc Natural Products (GLUCOSAMINE-CHONDROITIN DS) Tab Take  by mouth.       No current facility-administered medications for this visit.        Social History   Substance Use Topics   • Smoking status: Former Smoker     Packs/day: 1.00     Years: 20.00     Types: Cigarettes     Quit date: 1/1/1985   • Smokeless tobacco: Never Used   • Alcohol use 1.2 oz/week     2 Shots of liquor per week      Comment: soc        Family History   Problem Relation Age of Onset   • Cancer Mother         OVARIAN         Review of Systems   Patient denies Fevers/chills/nausea/vomiting/chest pain/sob/blood in stools/black stools/blood in urine.all other systems are reviewed See HPI    Exam:  /67 (BP Location: Left arm, Patient Position: Sitting, BP Cuff Size: Adult)   Pulse 60   Temp 36.5 °C (97.7 °F) (Temporal)   Ht 1.549 m (5' 1\")   Wt 98.4 kg (217 lb)   SpO2 95%  Body mass index is 41 " kg/m².  Constitutional:  NAD, well appearing.  HEENT:   NC/AT  Cardiovascular: RRR.   No m/r/g. No carotid bruits.       Lungs:   CTAB, no w/r/r, no respiratory distress.  Abdomen: Soft, NT/ND + BS, no masses, no suprapubic tenderness, no hepatomegaly.  Extremities:  2+ DP and radial pulses bilaterally.  No c/c/e.  Skin:  Warm and dry.    Neurologic: Alert & oriented x 3, CN II-XII grossly intact, strength and sensation grossly intact.  No focal deficits noted.  Psychiatric:  Affect normal, mood normal, judgment normal.    Assessment/Plan:     1. Essential hypertension  Currently blood pressure slightly high and patient trying to monitor her diet and exercise.  Discussed about low-salt diet and continue lisinopril 20 as prescribed.  With weight loss blood pressure should get better  - lisinopril (PRINIVIL) 20 MG Tab; Take 1 Tab by mouth every day.  Dispense: 90 Tab; Refill: 1    2. Dyslipidemia  Continue Lipitor as prescribed along with diet and exercise and lipid panel reviewed  - atorvastatin (LIPITOR) 10 MG Tab; TAKE 1 TABLET AT BEDTIME  Dispense: 90 Tab; Refill: 1    3. Hypercalcemia  Calcium level last time was at 11 and she is getting further detailed workup by endocrinologist for further evaluation    4. Morbid obesity (HCC)  Continue diet and exercise .  - Patient identified as having weight management issue.  Appropriate orders and counseling given.      All imaging results and lab results and consult notes are reviewed at this visit.  Followup: Return in about 6 months (around 10/10/2019) for Long- medicare wellnes and pelvic+pap.    Please note that this dictation was created using voice recognition software. I have made every reasonable attempt to correct obvious errors, but I expect that there are errors of grammar and possibly content that I did not discover before finalizing the note.

## 2019-04-10 NOTE — PATIENT INSTRUCTIONS
Pl get Tdap from pharm  Hypertension  Hypertension is another name for high blood pressure. High blood pressure forces your heart to work harder to pump blood. A blood pressure reading has two numbers, which includes a higher number over a lower number (example: 110/72).  Follow these instructions at home:  · Have your blood pressure rechecked by your doctor.  · Only take medicine as told by your doctor. Follow the directions carefully. The medicine does not work as well if you skip doses. Skipping doses also puts you at risk for problems.  · Do not smoke.  · Monitor your blood pressure at home as told by your doctor.  Contact a doctor if:  · You think you are having a reaction to the medicine you are taking.  · You have repeat headaches or feel dizzy.  · You have puffiness (swelling) in your ankles.  · You have trouble with your vision.  Get help right away if:  · You get a very bad headache and are confused.  · You feel weak, numb, or faint.  · You get chest or belly (abdominal) pain.  · You throw up (vomit).  · You cannot breathe very well.  This information is not intended to replace advice given to you by your health care provider. Make sure you discuss any questions you have with your health care provider.  Document Released: 06/05/2009 Document Revised: 05/25/2017 Document Reviewed: 10/10/2014  [employer] Interactive Patient Education © 2017 [employer]

## 2019-05-02 ENCOUNTER — TELEPHONE (OUTPATIENT)
Dept: ENDOCRINOLOGY | Facility: MEDICAL CENTER | Age: 69
End: 2019-05-02

## 2019-05-02 NOTE — TELEPHONE ENCOUNTER
1. Caller Name: Lore Henderson                                         Call Back Number: [phone] (home)       Patient approves a detailed voicemail message: yes    Pt is now living in [place] now and needs a referral to a new endo. She asked that Dr. Morris send one over to them. Told her I would send a request to him and we will let her know.     Referral needs to go to:   Jean Claude Maya M.D.   P: [phone]   F: [phone] Attn. to Adele in NP Dept.

## 2019-05-06 DIAGNOSIS — E55.9 VITAMIN D DEFICIENCY: ICD-10-CM

## 2019-05-06 DIAGNOSIS — E21.3 HYPERPARATHYROIDISM (HCC): ICD-10-CM

## 2019-05-06 DIAGNOSIS — E83.52 HIGH CALCIUM LEVELS: ICD-10-CM

## 2019-05-07 ENCOUNTER — PATIENT MESSAGE (OUTPATIENT)
Dept: ENDOCRINOLOGY | Facility: MEDICAL CENTER | Age: 69
End: 2019-05-07

## 2019-05-13 DIAGNOSIS — E83.52 HYPERCALCEMIA: ICD-10-CM

## 2019-05-13 DIAGNOSIS — E21.3 HYPERPARATHYROIDISM (HCC): ICD-10-CM

## 2020-07-29 ENCOUNTER — HOSPITAL ENCOUNTER (OUTPATIENT)
Dept: RADIOLOGY | Facility: MEDICAL CENTER | Age: 70
End: 2020-07-29
Attending: INTERNAL MEDICINE
Payer: MEDICARE

## 2020-07-29 DIAGNOSIS — Z12.31 VISIT FOR SCREENING MAMMOGRAM: ICD-10-CM

## 2020-07-29 PROCEDURE — 77067 SCR MAMMO BI INCL CAD: CPT

## 2021-03-03 DIAGNOSIS — Z23 NEED FOR VACCINATION: ICD-10-CM
